# Patient Record
Sex: FEMALE | URBAN - METROPOLITAN AREA
[De-identification: names, ages, dates, MRNs, and addresses within clinical notes are randomized per-mention and may not be internally consistent; named-entity substitution may affect disease eponyms.]

---

## 2022-08-08 ENCOUNTER — HOSPITAL ENCOUNTER (OUTPATIENT)
Dept: LAB | Age: 52
Discharge: HOME | End: 2022-08-08

## 2022-08-08 ENCOUNTER — OFFICE VISIT (OUTPATIENT)
Dept: FAMILY MEDICINE CLINIC | Facility: CLINIC | Age: 52
End: 2022-08-08

## 2022-08-08 VITALS
SYSTOLIC BLOOD PRESSURE: 113 MMHG | HEART RATE: 70 BPM | DIASTOLIC BLOOD PRESSURE: 75 MMHG | RESPIRATION RATE: 24 BRPM | TEMPERATURE: 96.9 F | HEIGHT: 62 IN | WEIGHT: 226 LBS | BODY MASS INDEX: 41.59 KG/M2 | OXYGEN SATURATION: 98 %

## 2022-08-08 DIAGNOSIS — Z12.11 SCREENING FOR COLON CANCER: ICD-10-CM

## 2022-08-08 DIAGNOSIS — Z00.00 ROUTINE GENERAL MEDICAL EXAMINATION AT A HEALTH CARE FACILITY: ICD-10-CM

## 2022-08-08 DIAGNOSIS — R79.89 ABNORMAL THYROID BLOOD TEST: ICD-10-CM

## 2022-08-08 DIAGNOSIS — Z00.00 ROUTINE GENERAL MEDICAL EXAMINATION AT A HEALTH CARE FACILITY: Primary | ICD-10-CM

## 2022-08-08 LAB
ALBUMIN SERPL-MCNC: 3.8 G/DL (ref 3.4–5)
ALBUMIN/GLOB SERPL: 0.9 {RATIO} (ref 0.8–1.7)
ALP SERPL-CCNC: 129 U/L (ref 45–117)
ALT SERPL-CCNC: 12 U/L (ref 13–56)
ANION GAP SERPL CALC-SCNC: 5 MMOL/L (ref 3–18)
AST SERPL-CCNC: 18 U/L (ref 10–38)
BASOPHILS # BLD: 0 K/UL (ref 0–0.1)
BASOPHILS NFR BLD: 1 % (ref 0–2)
BILIRUB SERPL-MCNC: 0.8 MG/DL (ref 0.2–1)
BUN SERPL-MCNC: 15 MG/DL (ref 7–18)
BUN/CREAT SERPL: 21 (ref 12–20)
CALCIUM SERPL-MCNC: 9.9 MG/DL (ref 8.5–10.1)
CHLORIDE SERPL-SCNC: 104 MMOL/L (ref 100–111)
CHOLEST SERPL-MCNC: 225 MG/DL
CO2 SERPL-SCNC: 31 MMOL/L (ref 21–32)
CREAT SERPL-MCNC: 0.72 MG/DL (ref 0.6–1.3)
DIFFERENTIAL METHOD BLD: NORMAL
EOSINOPHIL # BLD: 0.1 K/UL (ref 0–0.4)
EOSINOPHIL NFR BLD: 1 % (ref 0–5)
ERYTHROCYTE [DISTWIDTH] IN BLOOD BY AUTOMATED COUNT: 13.8 % (ref 11.6–14.5)
EST. AVERAGE GLUCOSE BLD GHB EST-MCNC: 123 MG/DL
GLOBULIN SER CALC-MCNC: 4.2 G/DL (ref 2–4)
GLUCOSE SERPL-MCNC: 92 MG/DL (ref 74–99)
HBA1C MFR BLD: 5.9 % (ref 4.2–5.6)
HCT VFR BLD AUTO: 43.9 % (ref 35–45)
HDLC SERPL-MCNC: 54 MG/DL (ref 40–60)
HDLC SERPL: 4.2 {RATIO} (ref 0–5)
HGB BLD-MCNC: 13.8 G/DL (ref 12–16)
IMM GRANULOCYTES # BLD AUTO: 0 K/UL (ref 0–0.04)
IMM GRANULOCYTES NFR BLD AUTO: 0 % (ref 0–0.5)
LDLC SERPL CALC-MCNC: 150 MG/DL (ref 0–100)
LIPID PROFILE,FLP: ABNORMAL
LYMPHOCYTES # BLD: 1.8 K/UL (ref 0.9–3.6)
LYMPHOCYTES NFR BLD: 31 % (ref 21–52)
MCH RBC QN AUTO: 27.6 PG (ref 24–34)
MCHC RBC AUTO-ENTMCNC: 31.4 G/DL (ref 31–37)
MCV RBC AUTO: 87.8 FL (ref 78–100)
MONOCYTES # BLD: 0.5 K/UL (ref 0.05–1.2)
MONOCYTES NFR BLD: 9 % (ref 3–10)
NEUTS SEG # BLD: 3.4 K/UL (ref 1.8–8)
NEUTS SEG NFR BLD: 58 % (ref 40–73)
NRBC # BLD: 0 K/UL (ref 0–0.01)
NRBC BLD-RTO: 0 PER 100 WBC
PLATELET # BLD AUTO: 292 K/UL (ref 135–420)
PMV BLD AUTO: 11 FL (ref 9.2–11.8)
POTASSIUM SERPL-SCNC: 4.4 MMOL/L (ref 3.5–5.5)
PROT SERPL-MCNC: 8 G/DL (ref 6.4–8.2)
RBC # BLD AUTO: 5 M/UL (ref 4.2–5.3)
SODIUM SERPL-SCNC: 140 MMOL/L (ref 136–145)
TRIGL SERPL-MCNC: 105 MG/DL (ref ?–150)
TSH SERPL DL<=0.05 MIU/L-ACNC: 0.29 UIU/ML (ref 0.36–3.74)
VLDLC SERPL CALC-MCNC: 21 MG/DL
WBC # BLD AUTO: 5.8 K/UL (ref 4.6–13.2)

## 2022-08-08 PROCEDURE — 84439 ASSAY OF FREE THYROXINE: CPT

## 2022-08-08 PROCEDURE — 84445 ASSAY OF TSI GLOBULIN: CPT

## 2022-08-08 PROCEDURE — 99202 OFFICE O/P NEW SF 15 MIN: CPT | Performed by: FAMILY MEDICINE

## 2022-08-08 PROCEDURE — 84443 ASSAY THYROID STIM HORMONE: CPT

## 2022-08-08 PROCEDURE — 80061 LIPID PANEL: CPT

## 2022-08-08 PROCEDURE — 85025 COMPLETE CBC W/AUTO DIFF WBC: CPT

## 2022-08-08 PROCEDURE — 80053 COMPREHEN METABOLIC PANEL: CPT

## 2022-08-08 PROCEDURE — 86803 HEPATITIS C AB TEST: CPT

## 2022-08-08 PROCEDURE — 83036 HEMOGLOBIN GLYCOSYLATED A1C: CPT

## 2022-08-08 NOTE — PROGRESS NOTES
Patient presents for lab draw ordered by:    Ordering Provider:  Clement Fletcher  Ordering Department/Practice:  [facility]  Phone:  [phone]  Date Ordered:  8/8/22    The following labs were drawn and sent to [facility] by Guille Cannon:    CBC, BMP, Lipid Profile, TSH, 3rd Generation, HgA1C, and HCGAB    The following tubes were sent:    Gold  ( 4) and Lavender  ( 1)    Draw site left brachial.  Patient tolerated draw with no distress.

## 2022-08-08 NOTE — PROGRESS NOTES
JAMES Ellison is a 47 y.o. female being seen today for   Chief Complaint   Patient presents with    Physical    Labs   IOV to care a [facility] for this pt.  she states that she had a mammogram through a free [facility] in [place] this year. It was normal.  Last pap was a few years ago and was normal.  She has not done colon cancer screening. Just wants a general check up and labs today. She feels good and has no particular concerns. Her parents have high blood pressrue. Dad has diabetes. No cancer in family. History reviewed. No pertinent past medical history. ROS  Patient states that she is feeling well. Denies complaints of chest pain, shortness of breath, swelling of legs, dizziness or weakness. she denies nausea, vomiting or diarrhea. No current outpatient medications on file. No current facility-administered medications for this visit. PE  Visit Vitals  /75 (BP 1 Location: Left upper arm, BP Patient Position: Sitting, BP Cuff Size: Large adult long)   Pulse 70   Temp 96.9 °F (36.1 °C) (Temporal)   Resp 24   Ht 5' 2\" (1.575 m)   Wt 226 lb (102.5 kg)   SpO2 98%   BMI 41.34 kg/m²        Alert and oriented with normal mood and affect. she is well developed and well nourished . Lungs are clear without wheezing. Heart rate is regular without murmurs or gallops. There is no lower extremity edema. No results found for this or any previous visit. Assessment and Plan:        ICD-10-CM ICD-9-CM    1. Routine general medical examination at a health care facility  Z00.00 V70.0 LIPID PANEL      HEMOGLOBIN A1C WITH EAG      TSH 3RD GENERATION      METABOLIC PANEL, COMPREHENSIVE      CBC WITH AUTOMATED DIFF      HEPATITIS C AB      2. Screening for colon cancer  Z12.11 V76.51 OCCULT BLOOD IMMUNOASSAY,DIAGNOSTIC      3.  Abnormal thyroid blood test  R79.89 790.6 T4, FREE      THYROID STIMULATING IMMUNOGLOBULIN          Labs today  Get mammogram recort  Fit test  Return for jovany Gary MD

## 2022-08-09 ENCOUNTER — TELEPHONE (OUTPATIENT)
Dept: FAMILY MEDICINE CLINIC | Facility: CLINIC | Age: 52
End: 2022-08-09

## 2022-08-09 LAB
HCV AB SER IA-ACNC: 0.06 INDEX
HCV AB SERPL QL IA: NEGATIVE
HCV COMMENT,HCGAC: NORMAL
T4 FREE SERPL-MCNC: 1.2 NG/DL (ref 0.7–1.5)

## 2022-08-09 NOTE — TELEPHONE ENCOUNTER
----- Message from Johana Melchor MD sent at 8/9/2022 10:47 AM EDT -----  Regarding: add t4  Can we add t4 and TSI to this nice pt labs from Monday?    Thank you

## 2022-08-09 NOTE — TELEPHONE ENCOUNTER
Spoke with Charlette Valera, in [facility]. Order for a TSI and T4 called in to be added onto patient labs.

## 2022-08-10 LAB — TSI ACT/NOR SER: <0.1 IU/L (ref 0–0.55)

## 2022-09-12 ENCOUNTER — HOSPITAL ENCOUNTER (OUTPATIENT)
Dept: LAB | Age: 52
Discharge: HOME | End: 2022-09-12

## 2022-09-12 ENCOUNTER — OFFICE VISIT (OUTPATIENT)
Dept: FAMILY MEDICINE CLINIC | Facility: CLINIC | Age: 52
End: 2022-09-12

## 2022-09-12 VITALS
DIASTOLIC BLOOD PRESSURE: 79 MMHG | WEIGHT: 231 LBS | RESPIRATION RATE: 18 BRPM | HEART RATE: 77 BPM | OXYGEN SATURATION: 97 % | HEIGHT: 62 IN | SYSTOLIC BLOOD PRESSURE: 117 MMHG | BODY MASS INDEX: 42.51 KG/M2 | TEMPERATURE: 97.4 F

## 2022-09-12 DIAGNOSIS — E28.319 EARLY MENOPAUSE: ICD-10-CM

## 2022-09-12 DIAGNOSIS — Z12.4 SCREENING FOR CERVICAL CANCER: Primary | ICD-10-CM

## 2022-09-12 DIAGNOSIS — R73.03 PREDIABETES: ICD-10-CM

## 2022-09-12 PROCEDURE — 87624 HPV HI-RISK TYP POOLED RSLT: CPT

## 2022-09-12 PROCEDURE — 88142 CYTOPATH C/V THIN LAYER: CPT

## 2022-09-12 PROCEDURE — 99213 OFFICE O/P EST LOW 20 MIN: CPT | Performed by: FAMILY MEDICINE

## 2022-09-12 RX ORDER — FERROUS SULFATE, DRIED 160(50) MG
1 TABLET, EXTENDED RELEASE ORAL 2 TIMES DAILY WITH MEALS
Qty: 180 TABLET | Refills: 1 | Status: SHIPPED | OUTPATIENT
Start: 2022-09-12

## 2022-09-12 NOTE — PROGRESS NOTES
JAMES Ruano is a 47 y.o. female being seen today for   Chief Complaint   Patient presents with    Well Woman   . follow up for this pt seen last month for checkup and labs. she states that she is here for pap smear. Her labs showed prediabetes and slightly high cholesterol. Her thyroid was borderline. No history of abnormal pap smear. No abnormal bleeding or discharge. History reviewed. No pertinent past medical history. ROS  Patient states that she is feeling well. Denies complaints of chest pain, shortness of breath, swelling of legs, dizziness or weakness. she denies nausea, vomiting or diarrhea. Current Outpatient Medications   Medication Sig    calcium-vitamin D (CALCIUM 500+D) 500 mg-200 unit per tablet Take 1 Tablet by mouth two (2) times daily (with meals). No current facility-administered medications for this visit. PE  Visit Vitals  /79 (BP 1 Location: Left arm, BP Patient Position: Sitting, BP Cuff Size: Adult long)   Pulse 77   Temp 97.4 °F (36.3 °C) (Temporal)   Resp 18   Ht 5' 2\" (1.575 m)   Wt 231 lb (104.8 kg)   LMP  (LMP Unknown)   SpO2 97%   BMI 42.25 kg/m²        Alert and oriented with normal mood and affect. she is well developed and well nourished . Lungs are clear without wheezing. Heart rate is regular without murmurs or gallops. There is no lower extremity edema. External genitalia wnl. Her cervix was initially hard to visualize, after bimanual exam, larger speculum used and cervix visualized with no lesion or discharge. Pap obtained.      Results for orders placed or performed during the hospital encounter of 08/08/22   LIPID PANEL   Result Value Ref Range    LIPID PROFILE          Cholesterol, total 225 (H) <200 MG/DL    Triglyceride 105 <150 MG/DL    HDL Cholesterol 54 40 - 60 MG/DL    LDL, calculated 150 (H) 0 - 100 MG/DL    VLDL, calculated 21 MG/DL    CHOL/HDL Ratio 4.2 0 - 5.0     HEMOGLOBIN A1C WITH EAG   Result Value Ref Range Hemoglobin A1c 5.9 (H) 4.2 - 5.6 %    Est. average glucose 123 mg/dL   TSH 3RD GENERATION   Result Value Ref Range    TSH 0.29 (L) 0.36 - 4.61 uIU/mL   METABOLIC PANEL, COMPREHENSIVE   Result Value Ref Range    Sodium 140 136 - 145 mmol/L    Potassium 4.4 3.5 - 5.5 mmol/L    Chloride 104 100 - 111 mmol/L    CO2 31 21 - 32 mmol/L    Anion gap 5 3.0 - 18 mmol/L    Glucose 92 74 - 99 mg/dL    BUN 15 7.0 - 18 MG/DL    Creatinine 0.72 0.6 - 1.3 MG/DL    BUN/Creatinine ratio 21 (H) 12 - 20      GFR est AA >60 >60 ml/min/1.73m2    GFR est non-AA >60 >60 ml/min/1.73m2    Calcium 9.9 8.5 - 10.1 MG/DL    Bilirubin, total 0.8 0.2 - 1.0 MG/DL    ALT (SGPT) 12 (L) 13 - 56 U/L    AST (SGOT) 18 10 - 38 U/L    Alk. phosphatase 129 (H) 45 - 117 U/L    Protein, total 8.0 6.4 - 8.2 g/dL    Albumin 3.8 3.4 - 5.0 g/dL    Globulin 4.2 (H) 2.0 - 4.0 g/dL    A-G Ratio 0.9 0.8 - 1.7     CBC WITH AUTOMATED DIFF   Result Value Ref Range    WBC 5.8 4.6 - 13.2 K/uL    RBC 5.00 4.20 - 5.30 M/uL    HGB 13.8 12.0 - 16.0 g/dL    HCT 43.9 35.0 - 45.0 %    MCV 87.8 78.0 - 100.0 FL    MCH 27.6 24.0 - 34.0 PG    MCHC 31.4 31.0 - 37.0 g/dL    RDW 13.8 11.6 - 14.5 %    PLATELET 693 954 - 666 K/uL    MPV 11.0 9.2 - 11.8 FL    NRBC 0.0 0  WBC    ABSOLUTE NRBC 0.00 0.00 - 0.01 K/uL    NEUTROPHILS 58 40 - 73 %    LYMPHOCYTES 31 21 - 52 %    MONOCYTES 9 3 - 10 %    EOSINOPHILS 1 0 - 5 %    BASOPHILS 1 0 - 2 %    IMMATURE GRANULOCYTES 0 0.0 - 0.5 %    ABS. NEUTROPHILS 3.4 1.8 - 8.0 K/UL    ABS. LYMPHOCYTES 1.8 0.9 - 3.6 K/UL    ABS. MONOCYTES 0.5 0.05 - 1.2 K/UL    ABS. EOSINOPHILS 0.1 0.0 - 0.4 K/UL    ABS. BASOPHILS 0.0 0.0 - 0.1 K/UL    ABS. IMM. GRANS. 0.0 0.00 - 0.04 K/UL    DF AUTOMATED     HEPATITIS C AB   Result Value Ref Range    Hepatitis C virus Ab 0.06 <0.80 Index    Hep C virus Ab Interp.  Negative NEG      Hep C  virus Ab comment         T4, FREE   Result Value Ref Range    T4, Free 1.2 0.7 - 1.5 NG/DL   THYROID STIMULATING IMMUNOGLOBULIN   Result Value Ref Range    Thyroid Stim Immunoglobulin <0.10 0.00 - 0.55 IU/L         Assessment and Plan:        ICD-10-CM ICD-9-CM    1. Screening for cervical cancer  Z12.4 V76.2 PAP + HPV DNA (HIGH RISK)      2. Prediabetes  R73.03 790.29       3. Early menopause  E28.319 256.31           Pap today  Recheck labs 6 mos, thyroid, a1c, lipids, vitamin D    Reviewed early menopause puts her at higher risk for osteoporosis. Reviewed how to keep bones healthy with exercise, calcuim, vitiamin D.      She will start a combination calcium/vit D supplement    Juan Cordoba MD

## 2022-09-13 PROBLEM — E28.319 EARLY MENOPAUSE: Status: ACTIVE | Noted: 2022-09-13

## 2022-09-13 PROBLEM — R73.03 PREDIABETES: Status: ACTIVE | Noted: 2022-09-13

## 2023-01-04 PROBLEM — R79.89 ABNORMAL THYROID BLOOD TEST: Status: ACTIVE | Noted: 2023-01-04
